# Patient Record
Sex: MALE | Race: WHITE
[De-identification: names, ages, dates, MRNs, and addresses within clinical notes are randomized per-mention and may not be internally consistent; named-entity substitution may affect disease eponyms.]

---

## 2018-10-17 ENCOUNTER — HOSPITAL ENCOUNTER (OUTPATIENT)
Dept: HOSPITAL 91 - SDS | Age: 48
Discharge: HOME | End: 2018-10-17
Payer: COMMERCIAL

## 2018-10-17 ENCOUNTER — HOSPITAL ENCOUNTER (OUTPATIENT)
Age: 48
Discharge: HOME | End: 2018-10-17

## 2018-10-17 DIAGNOSIS — I34.0: ICD-10-CM

## 2018-10-17 DIAGNOSIS — I48.91: Primary | ICD-10-CM

## 2018-10-17 DIAGNOSIS — I42.9: ICD-10-CM

## 2018-10-17 DIAGNOSIS — I10: ICD-10-CM

## 2018-10-17 LAB
ADD MAN DIFF?: NO
BASOPHIL #: 0 10^3/UL (ref 0–0.1)
BASOPHILS %: 0.7 % (ref 0–2)
BLOOD UREA NITROGEN: 18 MG/DL (ref 7–20)
CALCIUM: 9.7 MG/DL (ref 8.4–10.2)
CARBON DIOXIDE: 30 MMOL/L (ref 21–31)
CHLORIDE: 105 MMOL/L (ref 97–110)
CREATININE: 0.94 MG/DL (ref 0.61–1.24)
EOSINOPHILS #: 0.2 10^3/UL (ref 0–0.5)
EOSINOPHILS %: 3.5 % (ref 0–7)
GLUCOSE: 99 MG/DL (ref 70–220)
HEMATOCRIT: 39.3 % (ref 42–52)
HEMOGLOBIN: 13.6 G/DL (ref 14–18)
IMMATURE GRANS #M: 0.01 10^3/UL (ref 0–0.03)
IMMATURE GRANS % (M): 0.2 % (ref 0–0.43)
INR: 0.98
LYMPHOCYTES #: 2.5 10^3/UL (ref 0.8–2.9)
LYMPHOCYTES %: 41.9 % (ref 15–51)
MEAN CORPUSCULAR HEMOGLOBIN: 32.3 PG (ref 29–33)
MEAN CORPUSCULAR HGB CONC: 34.6 G/DL (ref 32–37)
MEAN CORPUSCULAR VOLUME: 93.3 FL (ref 82–101)
MEAN PLATELET VOLUME: 10.3 FL (ref 7.4–10.4)
MONOCYTE #: 0.4 10^3/UL (ref 0.3–0.9)
MONOCYTES %: 6.5 % (ref 0–11)
NEUTROPHIL #: 2.8 10^3/UL (ref 1.6–7.5)
NEUTROPHILS %: 47.2 % (ref 39–77)
NUCLEATED RED BLOOD CELLS #: 0 10^3/UL (ref 0–0)
NUCLEATED RED BLOOD CELLS%: 0 /100WBC (ref 0–0)
PARTIAL THROMBOPLASTIN TIME: 29.6 SEC (ref 23–35)
PLATELET COUNT: 236 10^3/UL (ref 140–415)
POTASSIUM: 4.7 MMOL/L (ref 3.5–5.1)
PROTIME: 13.1 SEC (ref 11.9–14.9)
PT RATIO: 1
RED BLOOD COUNT: 4.21 10^6/UL (ref 4.7–6.1)
RED CELL DISTRIBUTION WIDTH: 12.6 % (ref 11.5–14.5)
SODIUM: 141 MMOL/L (ref 135–144)
WHITE BLOOD COUNT: 6 10^3/UL (ref 4.8–10.8)

## 2018-10-17 PROCEDURE — 85730 THROMBOPLASTIN TIME PARTIAL: CPT

## 2018-10-17 PROCEDURE — 93312 ECHO TRANSESOPHAGEAL: CPT

## 2018-10-17 PROCEDURE — 71045 X-RAY EXAM CHEST 1 VIEW: CPT

## 2018-10-17 PROCEDURE — 85025 COMPLETE CBC W/AUTO DIFF WBC: CPT

## 2018-10-17 PROCEDURE — 92960 CARDIOVERSION ELECTRIC EXT: CPT

## 2018-10-17 PROCEDURE — 93005 ELECTROCARDIOGRAM TRACING: CPT

## 2018-10-17 PROCEDURE — 93320 DOPPLER ECHO COMPLETE: CPT

## 2018-10-17 PROCEDURE — 85610 PROTHROMBIN TIME: CPT

## 2018-10-17 PROCEDURE — 80048 BASIC METABOLIC PNL TOTAL CA: CPT

## 2018-10-18 LAB — ANION GAP: 6 (ref 8–16)

## 2019-01-08 ENCOUNTER — HOSPITAL ENCOUNTER (OUTPATIENT)
Dept: HOSPITAL 10 - CCL | Age: 49
Discharge: HOME | End: 2019-01-08
Attending: INTERNAL MEDICINE
Payer: COMMERCIAL

## 2019-01-08 ENCOUNTER — HOSPITAL ENCOUNTER (OUTPATIENT)
Dept: HOSPITAL 91 - CCL | Age: 49
Discharge: HOME | End: 2019-01-08
Payer: COMMERCIAL

## 2019-01-08 VITALS
BODY MASS INDEX: 28.88 KG/M2 | HEIGHT: 70 IN | WEIGHT: 201.72 LBS | BODY MASS INDEX: 28.88 KG/M2 | WEIGHT: 201.72 LBS | HEIGHT: 70 IN

## 2019-01-08 VITALS — RESPIRATION RATE: 17 BRPM | SYSTOLIC BLOOD PRESSURE: 108 MMHG | HEART RATE: 58 BPM | DIASTOLIC BLOOD PRESSURE: 72 MMHG

## 2019-01-08 VITALS — SYSTOLIC BLOOD PRESSURE: 91 MMHG | DIASTOLIC BLOOD PRESSURE: 70 MMHG | RESPIRATION RATE: 19 BRPM | HEART RATE: 52 BPM

## 2019-01-08 VITALS — SYSTOLIC BLOOD PRESSURE: 102 MMHG | DIASTOLIC BLOOD PRESSURE: 56 MMHG | HEART RATE: 62 BPM | RESPIRATION RATE: 12 BRPM

## 2019-01-08 VITALS — RESPIRATION RATE: 16 BRPM | DIASTOLIC BLOOD PRESSURE: 54 MMHG | SYSTOLIC BLOOD PRESSURE: 98 MMHG | HEART RATE: 56 BPM

## 2019-01-08 VITALS — SYSTOLIC BLOOD PRESSURE: 102 MMHG | HEART RATE: 58 BPM | RESPIRATION RATE: 15 BRPM | DIASTOLIC BLOOD PRESSURE: 69 MMHG

## 2019-01-08 VITALS — HEART RATE: 53 BPM | RESPIRATION RATE: 20 BRPM | SYSTOLIC BLOOD PRESSURE: 104 MMHG | DIASTOLIC BLOOD PRESSURE: 53 MMHG

## 2019-01-08 VITALS — HEART RATE: 54 BPM | RESPIRATION RATE: 16 BRPM | DIASTOLIC BLOOD PRESSURE: 63 MMHG | SYSTOLIC BLOOD PRESSURE: 110 MMHG

## 2019-01-08 VITALS — RESPIRATION RATE: 21 BRPM | DIASTOLIC BLOOD PRESSURE: 69 MMHG | SYSTOLIC BLOOD PRESSURE: 107 MMHG | HEART RATE: 66 BPM

## 2019-01-08 VITALS — DIASTOLIC BLOOD PRESSURE: 54 MMHG | SYSTOLIC BLOOD PRESSURE: 92 MMHG | RESPIRATION RATE: 18 BRPM | HEART RATE: 46 BPM

## 2019-01-08 VITALS — RESPIRATION RATE: 23 BRPM | HEART RATE: 56 BPM | SYSTOLIC BLOOD PRESSURE: 92 MMHG | DIASTOLIC BLOOD PRESSURE: 54 MMHG

## 2019-01-08 VITALS — DIASTOLIC BLOOD PRESSURE: 68 MMHG | HEART RATE: 64 BPM | RESPIRATION RATE: 21 BRPM | SYSTOLIC BLOOD PRESSURE: 103 MMHG

## 2019-01-08 VITALS — HEART RATE: 51 BPM | SYSTOLIC BLOOD PRESSURE: 102 MMHG | RESPIRATION RATE: 14 BRPM | DIASTOLIC BLOOD PRESSURE: 55 MMHG

## 2019-01-08 VITALS — RESPIRATION RATE: 16 BRPM | DIASTOLIC BLOOD PRESSURE: 64 MMHG | HEART RATE: 52 BPM | SYSTOLIC BLOOD PRESSURE: 99 MMHG

## 2019-01-08 VITALS — HEART RATE: 65 BPM | SYSTOLIC BLOOD PRESSURE: 97 MMHG | RESPIRATION RATE: 18 BRPM | DIASTOLIC BLOOD PRESSURE: 60 MMHG

## 2019-01-08 VITALS — HEART RATE: 48 BPM | SYSTOLIC BLOOD PRESSURE: 105 MMHG | DIASTOLIC BLOOD PRESSURE: 65 MMHG | RESPIRATION RATE: 17 BRPM

## 2019-01-08 VITALS — RESPIRATION RATE: 12 BRPM | SYSTOLIC BLOOD PRESSURE: 91 MMHG | HEART RATE: 46 BPM | DIASTOLIC BLOOD PRESSURE: 55 MMHG

## 2019-01-08 VITALS — HEART RATE: 54 BPM | SYSTOLIC BLOOD PRESSURE: 95 MMHG | DIASTOLIC BLOOD PRESSURE: 55 MMHG | RESPIRATION RATE: 13 BRPM

## 2019-01-08 VITALS — SYSTOLIC BLOOD PRESSURE: 94 MMHG | DIASTOLIC BLOOD PRESSURE: 53 MMHG | RESPIRATION RATE: 17 BRPM | HEART RATE: 60 BPM

## 2019-01-08 DIAGNOSIS — E78.5: ICD-10-CM

## 2019-01-08 DIAGNOSIS — I48.91: ICD-10-CM

## 2019-01-08 DIAGNOSIS — I25.10: Primary | ICD-10-CM

## 2019-01-08 DIAGNOSIS — I10: ICD-10-CM

## 2019-01-08 LAB
ADD MAN DIFF?: NO
ANION GAP: 7 (ref 5–13)
BASOPHIL #: 0.1 10^3/UL (ref 0–0.1)
BASOPHILS %: 0.6 % (ref 0–2)
BLOOD UREA NITROGEN: 23 MG/DL (ref 7–20)
CALCIUM: 9.5 MG/DL (ref 8.4–10.2)
CARBON DIOXIDE: 27 MMOL/L (ref 21–31)
CHLORIDE: 104 MMOL/L (ref 97–110)
CREATININE: 0.96 MG/DL (ref 0.61–1.24)
EOSINOPHILS #: 0.3 10^3/UL (ref 0–0.5)
EOSINOPHILS %: 3.1 % (ref 0–7)
GLUCOSE: 100 MG/DL (ref 70–220)
HEMATOCRIT: 37 % (ref 42–52)
HEMOGLOBIN: 12.5 G/DL (ref 14–18)
IMMATURE GRANS #M: 0.03 10^3/UL (ref 0–0.03)
IMMATURE GRANS % (M): 0.4 % (ref 0–0.43)
INR: 0.92
LYMPHOCYTES #: 2.3 10^3/UL (ref 0.8–2.9)
LYMPHOCYTES %: 28.5 % (ref 15–51)
MEAN CORPUSCULAR HEMOGLOBIN: 32 PG (ref 29–33)
MEAN CORPUSCULAR HGB CONC: 33.8 G/DL (ref 32–37)
MEAN CORPUSCULAR VOLUME: 94.6 FL (ref 82–101)
MEAN PLATELET VOLUME: 9.8 FL (ref 7.4–10.4)
MONOCYTE #: 0.5 10^3/UL (ref 0.3–0.9)
MONOCYTES %: 6.3 % (ref 0–11)
NEUTROPHIL #: 4.9 10^3/UL (ref 1.6–7.5)
NEUTROPHILS %: 61.1 % (ref 39–77)
NUCLEATED RED BLOOD CELLS #: 0 10^3/UL (ref 0–0)
NUCLEATED RED BLOOD CELLS%: 0 /100WBC (ref 0–0)
PARTIAL THROMBOPLASTIN TIME: 28.2 SEC (ref 23–35)
PLATELET COUNT: 267 10^3/UL (ref 140–415)
POTASSIUM: 4.4 MMOL/L (ref 3.5–5.1)
PROTIME: 12.5 SEC (ref 11.9–14.9)
PT RATIO: 1
RED BLOOD COUNT: 3.91 10^6/UL (ref 4.7–6.1)
RED CELL DISTRIBUTION WIDTH: 13 % (ref 11.5–14.5)
SODIUM: 138 MMOL/L (ref 135–144)
TRIGLYCERIDES: 202 MG/DL (ref 0–149)
WHITE BLOOD COUNT: 8 10^3/UL (ref 4.8–10.8)

## 2019-01-08 PROCEDURE — 85610 PROTHROMBIN TIME: CPT

## 2019-01-08 PROCEDURE — 93458 L HRT ARTERY/VENTRICLE ANGIO: CPT

## 2019-01-08 PROCEDURE — 85025 COMPLETE CBC W/AUTO DIFF WBC: CPT

## 2019-01-08 PROCEDURE — 71045 X-RAY EXAM CHEST 1 VIEW: CPT

## 2019-01-08 PROCEDURE — 80048 BASIC METABOLIC PNL TOTAL CA: CPT

## 2019-01-08 PROCEDURE — 93005 ELECTROCARDIOGRAM TRACING: CPT

## 2019-01-08 PROCEDURE — 84478 ASSAY OF TRIGLYCERIDES: CPT

## 2019-01-08 PROCEDURE — 85730 THROMBOPLASTIN TIME PARTIAL: CPT

## 2019-01-08 PROCEDURE — C1887 CATHETER, GUIDING: HCPCS

## 2019-01-08 NOTE — CARRPT
DATE OF PROCEDURE:  01/08/2019

 

 

TYPE OF PROCEDURES:

1.  Left heart catheterization.

2.  Coronary angiography.

3.  Left ventriculogram.

4.  Moderate conscious sedation.

 

ATTENDING PHYSICIAN:  Shaheen Donnelly MD

 

REFERRING PHYSICIAN:  Self-referred.

 

INDICATION:  Cardiomyopathy with newly severely depressed left ventricular ejection fraction and posi
tive stress test findings for ischemia.

 

TYPE OF ANESTHESIA:  Conscious and local.

 

BRIEF HISTORY:  Mr. Smith is a 48-year-old male with history of atrial fibrillation, hypertension, dy
slipidemia, who had presented for evaluation of atrial fibrillation and then lost outpatient followup
, who came back with atrial fibrillation with rapid ventricular response and in this setting, he was 
placed on medical therapy, improved rate control.  He underwent an echo revealing a new severely depr
essed EF.  Given these findings, the patient was referred for and presents today in order to undergo 
left heart catheterization to assess possibility of significant obstructive coronary artery disease l
ending to symptoms of shortness of breath and severe depressed left ventricular ejection fraction.

 

DESCRIPTION OF PROCEDURE:  After informed consent was obtained, the patient was brought to the [facility] cardiac catheterization lab where his right radial area was prepped and draped
 in sterile fashion.  A 2% lidocaine was infiltrated into right radial area in order to achieve adequ
ate anesthesia.  Using the modified Seldinger technique, the radial artery was cannulated and a 6-Rigo
[facility] JL3.5 catheter was used to cannulate the left main coronary ostium.  With contrast injection, mul
tiple views of left coronary system were obtained.  JL3.5 was removed over a guidewire and a JR4 was 
used to cannulate the right coronary arterial ostium.  With contrast injection, multiple views of the
 right coronary system were obtained.  JR4 was removed over a guidewire and a 6-Dominican pigtail was pa
ssed down the ascending aorta, placed in LV.  LVEDP was measured.  Power injector was injected opacif
jennifer the ventricle, 20 mL of contrast and pulled back across the aortic valve to assess for significa
nt gradient, which there was not and removed.  Subsequently at this time, this completed the procedur
e.  The patient's sheath was removed.  TR band was applied.  There were no noted complications.

 

FINDINGS:  Coronary angiography:  Left main is 4 mm, no significant focal stenoses.  Circumflex proxi
nolan is a 3 mm vessel and has no significant focal stenoses.  Mid branching obtuse marginal is 2.5 m
m, no significant focal stenoses.  LAD proximally is a 3.5 mm vessel and has mild luminal irregularit
ies of 10% to 20%.  Mid LAD is free of significant focal stenoses around the apex.  There are mid bra
nching diagonals, 2 mm vessels with no significant focal stenoses.  The right coronary artery proxima
lly is a 3.5 mm vessel, dominant vessel, has mild luminal irregularities 10% to 20% and gives off a 3
 mm PDA and a 2.5 mm posterolateral branch which covers a long territory and has no significant focal
 stenoses.

 

Left ventricular end diastolic pressure of 5 pre-LV gram, 7 post-LV gram.  No aortic stenoses by grad
ient.  Left ventricular ejection fraction is approximately 25% with global severe hypokinesis.  No si
gnificant aortic stenosis by gradient.

 

TOTAL FLUOROSCOPY TIME:  4.6 minutes.

 

TOTAL CONTRAST:  60 mL.

 

IMPRESSION:

1.  A very mild nonobstructive coronary artery disease with essentially normal coronary arteries.

2.  Low normal left heart filling pressures.

3.  No significant aortic stenosis by gradient.

4.  Severely depressed left ventricular systolic function.

 

RECOMMENDATIONS:  In light of procedure findings, we would:

1.  Maintain the patient on Toprol and lisinopril in treatment of cardiomyopathy.

2.  Continue the patient's Eliquis twice a day.

3.  We will continue the patient's amiodarone at this time daily in attempt to see the patient will r
eturn to sinus rhythm as patient is in atrial fibrillation at this time.

4.  Followup appointment as previously scheduled in my office.

 

 

Dictated By: SHAHEEN REYES/MIGUELITO

DD:    01/08/2019 14:13:07

DT:    01/08/2019 16:27:58

Conf#: [id number]

DID#:  [id number]

## 2019-01-08 NOTE — SIPON
Date/Time of Note


Date/Time of Note


DATE: 1/8/19 


TIME: 14:00





Operative Report


Preoperative Diagnosis


1. Cardiomyopathy


Postoperative Diagnosis


1.non-obstructive cad


Operation/Procedure Performed


1.[facility]


Surgeon


see signature line


First assist


1.Guerrero


Anesthesia:  moderate sedation


Estimated blood loss:  minimal


Transfusion Required


   none


Specimen


none


Grafts/Implants


none


Complications


none











SHAHEEN RAYMOND                Jan 8, 2019 14:03

## 2019-01-09 NOTE — RADRPT
Vent Rate: 54 bpm

RR Interval: 0 msec

[place] Interval: 0 msec

QRS Duration: 100 msec

QT Interval: 452 msec

QTC Interval: 428 msec

P-R-T Axis: 0 - 22 - 26 degrees

 

 Atrial fibrillation with slow ventricular response

 Abnormal ECG

 

Electronically Signed By: Sonu Kumar

[id number]

## 2019-04-26 ENCOUNTER — HOSPITAL ENCOUNTER (OUTPATIENT)
Dept: HOSPITAL 10 - CCL | Age: 49
Discharge: HOME | End: 2019-04-26
Attending: INTERNAL MEDICINE
Payer: COMMERCIAL

## 2019-04-26 ENCOUNTER — HOSPITAL ENCOUNTER (OUTPATIENT)
Dept: HOSPITAL 91 - CCL | Age: 49
Discharge: HOME | End: 2019-04-26
Payer: COMMERCIAL

## 2019-04-26 VITALS — RESPIRATION RATE: 10 BRPM | HEART RATE: 46 BPM | SYSTOLIC BLOOD PRESSURE: 82 MMHG | DIASTOLIC BLOOD PRESSURE: 51 MMHG

## 2019-04-26 VITALS — HEART RATE: 46 BPM | SYSTOLIC BLOOD PRESSURE: 92 MMHG | DIASTOLIC BLOOD PRESSURE: 52 MMHG | RESPIRATION RATE: 11 BRPM

## 2019-04-26 VITALS — HEART RATE: 48 BPM | RESPIRATION RATE: 11 BRPM | DIASTOLIC BLOOD PRESSURE: 55 MMHG | SYSTOLIC BLOOD PRESSURE: 85 MMHG

## 2019-04-26 VITALS — HEART RATE: 46 BPM | SYSTOLIC BLOOD PRESSURE: 88 MMHG | DIASTOLIC BLOOD PRESSURE: 52 MMHG | RESPIRATION RATE: 12 BRPM

## 2019-04-26 VITALS — DIASTOLIC BLOOD PRESSURE: 49 MMHG | RESPIRATION RATE: 10 BRPM | HEART RATE: 46 BPM | SYSTOLIC BLOOD PRESSURE: 83 MMHG

## 2019-04-26 VITALS — RESPIRATION RATE: 13 BRPM | DIASTOLIC BLOOD PRESSURE: 48 MMHG | HEART RATE: 46 BPM | SYSTOLIC BLOOD PRESSURE: 91 MMHG

## 2019-04-26 VITALS
BODY MASS INDEX: 29.19 KG/M2 | HEIGHT: 70 IN | WEIGHT: 203.93 LBS | WEIGHT: 203.93 LBS | BODY MASS INDEX: 29.19 KG/M2 | HEIGHT: 70 IN

## 2019-04-26 VITALS — RESPIRATION RATE: 18 BRPM | HEART RATE: 51 BPM | SYSTOLIC BLOOD PRESSURE: 101 MMHG | DIASTOLIC BLOOD PRESSURE: 56 MMHG

## 2019-04-26 VITALS — RESPIRATION RATE: 12 BRPM | DIASTOLIC BLOOD PRESSURE: 55 MMHG | SYSTOLIC BLOOD PRESSURE: 88 MMHG | HEART RATE: 48 BPM

## 2019-04-26 VITALS — SYSTOLIC BLOOD PRESSURE: 83 MMHG | RESPIRATION RATE: 12 BRPM | HEART RATE: 46 BPM | DIASTOLIC BLOOD PRESSURE: 53 MMHG

## 2019-04-26 VITALS — RESPIRATION RATE: 12 BRPM | SYSTOLIC BLOOD PRESSURE: 88 MMHG | DIASTOLIC BLOOD PRESSURE: 55 MMHG | HEART RATE: 46 BPM

## 2019-04-26 VITALS — DIASTOLIC BLOOD PRESSURE: 51 MMHG | HEART RATE: 46 BPM | RESPIRATION RATE: 10 BRPM | SYSTOLIC BLOOD PRESSURE: 85 MMHG

## 2019-04-26 VITALS — DIASTOLIC BLOOD PRESSURE: 48 MMHG | RESPIRATION RATE: 11 BRPM | SYSTOLIC BLOOD PRESSURE: 92 MMHG | HEART RATE: 46 BPM

## 2019-04-26 VITALS — DIASTOLIC BLOOD PRESSURE: 55 MMHG | SYSTOLIC BLOOD PRESSURE: 86 MMHG | RESPIRATION RATE: 12 BRPM | HEART RATE: 48 BPM

## 2019-04-26 VITALS — SYSTOLIC BLOOD PRESSURE: 86 MMHG | DIASTOLIC BLOOD PRESSURE: 56 MMHG | HEART RATE: 46 BPM | RESPIRATION RATE: 12 BRPM

## 2019-04-26 VITALS — HEART RATE: 46 BPM | SYSTOLIC BLOOD PRESSURE: 88 MMHG | RESPIRATION RATE: 11 BRPM | DIASTOLIC BLOOD PRESSURE: 56 MMHG

## 2019-04-26 VITALS — RESPIRATION RATE: 16 BRPM | DIASTOLIC BLOOD PRESSURE: 63 MMHG | HEART RATE: 55 BPM | SYSTOLIC BLOOD PRESSURE: 97 MMHG

## 2019-04-26 VITALS — DIASTOLIC BLOOD PRESSURE: 58 MMHG | HEART RATE: 46 BPM | RESPIRATION RATE: 11 BRPM | SYSTOLIC BLOOD PRESSURE: 92 MMHG

## 2019-04-26 DIAGNOSIS — I10: ICD-10-CM

## 2019-04-26 DIAGNOSIS — I48.0: Primary | ICD-10-CM

## 2019-04-26 DIAGNOSIS — I42.9: ICD-10-CM

## 2019-04-26 LAB
ADD MAN DIFF?: NO
ANION GAP: 9 (ref 5–13)
BASOPHIL #: 0.1 10^3/UL (ref 0–0.1)
BASOPHILS %: 0.7 % (ref 0–2)
BLOOD UREA NITROGEN: 20 MG/DL (ref 7–20)
CALCIUM: 9.2 MG/DL (ref 8.4–10.2)
CARBON DIOXIDE: 26 MMOL/L (ref 21–31)
CHLORIDE: 105 MMOL/L (ref 97–110)
CREATININE: 1.24 MG/DL (ref 0.61–1.24)
EOSINOPHILS #: 0.2 10^3/UL (ref 0–0.5)
EOSINOPHILS %: 2.5 % (ref 0–7)
GLUCOSE: 96 MG/DL (ref 70–220)
HEMATOCRIT: 36.8 % (ref 42–52)
HEMOGLOBIN: 12.4 G/DL (ref 14–18)
IMMATURE GRANS #M: 0.03 10^3/UL (ref 0–0.03)
IMMATURE GRANS % (M): 0.4 % (ref 0–0.43)
INR: 0.92
LYMPHOCYTES #: 2.6 10^3/UL (ref 0.8–2.9)
LYMPHOCYTES %: 31.3 % (ref 15–51)
MEAN CORPUSCULAR HEMOGLOBIN: 32.3 PG (ref 29–33)
MEAN CORPUSCULAR HGB CONC: 33.7 G/DL (ref 32–37)
MEAN CORPUSCULAR VOLUME: 95.8 FL (ref 82–101)
MEAN PLATELET VOLUME: 10.2 FL (ref 7.4–10.4)
MONOCYTE #: 0.5 10^3/UL (ref 0.3–0.9)
MONOCYTES %: 6.4 % (ref 0–11)
NEUTROPHIL #: 4.8 10^3/UL (ref 1.6–7.5)
NEUTROPHILS %: 58.7 % (ref 39–77)
NUCLEATED RED BLOOD CELLS #: 0 10^3/UL (ref 0–0)
NUCLEATED RED BLOOD CELLS%: 0 /100WBC (ref 0–0)
PARTIAL THROMBOPLASTIN TIME: 28.1 SEC (ref 23–35)
PLATELET COUNT: 270 10^3/UL (ref 140–415)
POTASSIUM: 4.5 MMOL/L (ref 3.5–5.1)
PROTIME: 12.5 SEC (ref 11.9–14.9)
PT RATIO: 1
RED BLOOD COUNT: 3.84 10^6/UL (ref 4.7–6.1)
RED CELL DISTRIBUTION WIDTH: 13.2 % (ref 11.5–14.5)
SODIUM: 140 MMOL/L (ref 135–144)
WHITE BLOOD COUNT: 8.3 10^3/UL (ref 4.8–10.8)

## 2019-04-26 PROCEDURE — 93312 ECHO TRANSESOPHAGEAL: CPT

## 2019-04-26 PROCEDURE — 85025 COMPLETE CBC W/AUTO DIFF WBC: CPT

## 2019-04-26 PROCEDURE — 80048 BASIC METABOLIC PNL TOTAL CA: CPT

## 2019-04-26 PROCEDURE — 85730 THROMBOPLASTIN TIME PARTIAL: CPT

## 2019-04-26 PROCEDURE — 93005 ELECTROCARDIOGRAM TRACING: CPT

## 2019-04-26 PROCEDURE — 93320 DOPPLER ECHO COMPLETE: CPT

## 2019-04-26 PROCEDURE — 92960 CARDIOVERSION ELECTRIC EXT: CPT

## 2019-04-26 PROCEDURE — 85610 PROTHROMBIN TIME: CPT

## 2019-04-26 NOTE — PAC
Date/Time of Note


Date/Time of Note


DATE: 4/26/19 


TIME: 11:33





Post-Anesthesia Notes


Post-Anesthesia Note


Last documented vital signs





Vital Signs


  Date      Temp  Pulse  Resp  B/P (MAP)   Pulse Ox  O2          O2 Flow    FiO2


Time                                                 Delivery    Rate


   4/26/19  98.1     55    16  97/63 (74)        97  Room Air


     10:47





Activity:  WNL


Respiratory function:  WNL


Cardiovascular function:  WNL


Mental status:  Baseline


Pain reasonably controlled:  Yes


Hydration appropriate:  Yes


Nausea/Vomiting absent:  Yes


Comments


BP: 95/58


HR: 60


RR: 15


T: 98


SaO2: 99%











RUSSELL BENOIT MD                Apr 26, 2019 11:33

## 2019-04-26 NOTE — PREAC
Date/Time of Note


Date/Time of Note


DATE: 4/26/19 


TIME: 10:50





Anesthesia Eval and Record


Evaluation


Time Pre-Procedure Interview


DATE: 4/26/19 


TIME: 10:50


Age


48


Sex


male


NPO:  8 hrs


Preoperative diagnosis


atrial fibrillation


Planned procedure


BREE, cardioversion





Past Medical History


Past Medical History:  Includes


Cardio:  HTN, Arrythmia (afib), Other (cardiomyopathy, mitral regurgitation)





Surgery & Anesthesia Issues


No known issue





Meds


Anticoagulation:  Yes


Beta Blocker within 24 hr:  Yes


Reason Beta Blocker not given:  Bradycarida, Hypotension


Reported Medications


Sacubitril/Valsartan (Entresto 49 mg-51 mg Tablet) 1 Each Tablet, 1 EACH PO BID,


TAB


   4/26/19


Spironolactone* (Aldactone*) 25 Mg Tablet, 12.5 MG PO DAILY, #30 TAB


   4/26/19


Amiodarone Hcl* (Amiodarone Hcl*) 200 Mg Tablet, 200 MG PO DAILY, #30 TAB


   1/8/19


Metoprolol Succinate* (Toprol XL*) 25 Mg Tab.sr.24h, 25 MG PO DAILY, #30 TAB


   10/17/18


Apixaban* (Eliquis*) 5 Mg Tablet, 5 MG PO BID, TAB


   10/17/18


Discontinued Reported Medications


Lisinopril* (Lisinopril*) 2.5 Mg Tablet, 2.5 MG PO DAILY, #30 TAB


   10/17/18


Meds reviewed:  Yes





Allergies


Coded Allergies:  


     No Known Allergy (Unverified , 1/8/19)


Allergies Reviewed:  Yes





Labs/Studies


Labs Reviewed:  Reviewed by anesthesiologist


Result Diagram:  


4/26/19 1030





Laboratory Tests


4/26/19 10:30








Pregnancy test:  N/A





Pre-procedure Exam


Airway:  Adequate mouth opening, Adequate thyromental dist


Mallampati:  Mallampati II


Teeth:  Normal


Lung:  Normal


Heart:  Normal





ASA Physical Status


ASA physical status:  3


Emergency:  None





Planned Anesthetic


General/MAC:  Mask





Planned Pain Management


Parenteral pain med





Pre-operative Attestations


Prior to commencing anesthesia and surgery, the patient was re-evaluated, there 


was verification of:


*The patient's identity


*The results of appropriate recent lab work and preoperative vital signs


*The above evaluation not changing prior to induction


*Anesthetic plan, risk benefits, alternative and complications discussed with 


patient/family; questions answered; patient/family understands, accepts and 


wishes to proceed.











RUSSELL BENOIT MD                Apr 26, 2019 10:52

## 2019-04-26 NOTE — SIPON
Date/Time of Note


Date/Time of Note


DATE: 4/26/19 


TIME: 11:18





Operative Report


Preoperative Diagnosis


1.AF


Postoperative Diagnosis


1.successful DCCV from AF to SR


2.BREE with no definite LA/HILLARY thrombus


Operation/Procedure Performed


1.BREE


2.DCCV AF to SR


Surgeon


see signature line


First assist


1.Daniel


Anesthesia:  moderate sedation


Estimated blood loss:  minimal


Transfusion Required


   none


Specimen


none


Grafts/Implants


none


Complications


none











SHAHEEN RAYMOND               Apr 26, 2019 11:21

## 2019-04-29 NOTE — RADRPT
Vent Rate: 50 bpm

RR Interval: 1192 msec

MT Interval: 215 msec

QRS Duration: 95 msec

QT Interval: 485 msec

QTC Interval: 444 msec

P-R-T Axis: 51 - 13 - 30 degrees

 

Sinus rhythm...normal P axis, V-rate  50- 99

Prolonged MT interval...MT >210, V-rate  50- 90

Probable left atrial enlargement...P >50mS, <-0.10mV V1

 

Electronically Signed By: Sonu Kumar

## 2019-08-27 ENCOUNTER — HOSPITAL ENCOUNTER (EMERGENCY)
Dept: HOSPITAL 10 - E/R | Age: 49
Discharge: HOME | End: 2019-08-27
Payer: COMMERCIAL

## 2019-08-27 ENCOUNTER — HOSPITAL ENCOUNTER (EMERGENCY)
Dept: HOSPITAL 91 - E/R | Age: 49
Discharge: HOME | End: 2019-08-27
Payer: COMMERCIAL

## 2019-08-27 VITALS — DIASTOLIC BLOOD PRESSURE: 61 MMHG | SYSTOLIC BLOOD PRESSURE: 102 MMHG | HEART RATE: 78 BPM | RESPIRATION RATE: 16 BRPM

## 2019-08-27 VITALS
WEIGHT: 210.1 LBS | WEIGHT: 210.1 LBS | BODY MASS INDEX: 30.08 KG/M2 | HEIGHT: 70 IN | HEIGHT: 70 IN | BODY MASS INDEX: 30.08 KG/M2

## 2019-08-27 DIAGNOSIS — Z79.01: ICD-10-CM

## 2019-08-27 DIAGNOSIS — R20.0: Primary | ICD-10-CM

## 2019-08-27 DIAGNOSIS — R22.0: ICD-10-CM

## 2019-08-27 DIAGNOSIS — R00.1: ICD-10-CM

## 2019-08-27 LAB
ADD MAN DIFF?: NO
ANION GAP: 8 (ref 5–13)
BASOPHIL #: 0.1 10^3/UL (ref 0–0.1)
BASOPHILS %: 0.8 % (ref 0–2)
BLOOD UREA NITROGEN: 21 MG/DL (ref 7–20)
CALCIUM: 9.4 MG/DL (ref 8.4–10.2)
CARBON DIOXIDE: 27 MMOL/L (ref 21–31)
CHLORIDE: 100 MMOL/L (ref 97–110)
CREATININE: 1.66 MG/DL (ref 0.61–1.24)
EOSINOPHILS #: 0.2 10^3/UL (ref 0–0.5)
EOSINOPHILS %: 3.5 % (ref 0–7)
GLUCOSE: 84 MG/DL (ref 70–220)
HEMATOCRIT: 30.8 % (ref 42–52)
HEMOGLOBIN: 10.2 G/DL (ref 14–18)
IMMATURE GRANS #M: 0.07 10^3/UL (ref 0–0.03)
IMMATURE GRANS % (M): 1.2 % (ref 0–0.43)
LYMPHOCYTES #: 1.4 10^3/UL (ref 0.8–2.9)
LYMPHOCYTES %: 23.5 % (ref 15–51)
MEAN CORPUSCULAR HEMOGLOBIN: 33 PG (ref 29–33)
MEAN CORPUSCULAR HGB CONC: 33.1 G/DL (ref 32–37)
MEAN CORPUSCULAR VOLUME: 99.7 FL (ref 82–101)
MEAN PLATELET VOLUME: 10.1 FL (ref 7.4–10.4)
MONOCYTE #: 0.4 10^3/UL (ref 0.3–0.9)
MONOCYTES %: 7.3 % (ref 0–11)
NEUTROPHIL #: 3.8 10^3/UL (ref 1.6–7.5)
NEUTROPHILS %: 63.7 % (ref 39–77)
NUCLEATED RED BLOOD CELLS #: 0 10^3/UL (ref 0–0)
NUCLEATED RED BLOOD CELLS%: 0 /100WBC (ref 0–0)
PLATELET COUNT: 234 10^3/UL (ref 140–415)
POTASSIUM: 5.2 MMOL/L (ref 3.5–5.1)
RED BLOOD COUNT: 3.09 10^6/UL (ref 4.7–6.1)
RED CELL DISTRIBUTION WIDTH: 14.1 % (ref 11.5–14.5)
SODIUM: 135 MMOL/L (ref 135–144)
TROPONIN-I: < 0.012 NG/ML (ref 0–0.12)
WHITE BLOOD COUNT: 6 10^3/UL (ref 4.8–10.8)

## 2019-08-27 PROCEDURE — 36415 COLL VENOUS BLD VENIPUNCTURE: CPT

## 2019-08-27 PROCEDURE — 80048 BASIC METABOLIC PNL TOTAL CA: CPT

## 2019-08-27 PROCEDURE — 93005 ELECTROCARDIOGRAM TRACING: CPT

## 2019-08-27 PROCEDURE — 71045 X-RAY EXAM CHEST 1 VIEW: CPT

## 2019-08-27 PROCEDURE — 70450 CT HEAD/BRAIN W/O DYE: CPT

## 2019-08-27 PROCEDURE — 99285 EMERGENCY DEPT VISIT HI MDM: CPT

## 2019-08-27 PROCEDURE — 84484 ASSAY OF TROPONIN QUANT: CPT

## 2019-08-27 PROCEDURE — 85025 COMPLETE CBC W/AUTO DIFF WBC: CPT
